# Patient Record
Sex: MALE | Race: WHITE | NOT HISPANIC OR LATINO
[De-identification: names, ages, dates, MRNs, and addresses within clinical notes are randomized per-mention and may not be internally consistent; named-entity substitution may affect disease eponyms.]

---

## 2019-06-04 ENCOUNTER — TRANSCRIPTION ENCOUNTER (OUTPATIENT)
Age: 24
End: 2019-06-04

## 2019-06-08 ENCOUNTER — TRANSCRIPTION ENCOUNTER (OUTPATIENT)
Age: 24
End: 2019-06-08

## 2019-06-10 ENCOUNTER — APPOINTMENT (OUTPATIENT)
Dept: ORTHOPEDIC SURGERY | Facility: CLINIC | Age: 24
End: 2019-06-10
Payer: COMMERCIAL

## 2019-06-10 VITALS
HEART RATE: 67 BPM | DIASTOLIC BLOOD PRESSURE: 65 MMHG | WEIGHT: 210 LBS | SYSTOLIC BLOOD PRESSURE: 130 MMHG | HEIGHT: 73 IN | BODY MASS INDEX: 27.83 KG/M2

## 2019-06-10 PROBLEM — Z00.00 ENCOUNTER FOR PREVENTIVE HEALTH EXAMINATION: Status: ACTIVE | Noted: 2019-06-10

## 2019-06-10 PROCEDURE — 73630 X-RAY EXAM OF FOOT: CPT | Mod: RT

## 2019-06-10 PROCEDURE — 99203 OFFICE O/P NEW LOW 30 MIN: CPT

## 2019-06-17 ENCOUNTER — APPOINTMENT (OUTPATIENT)
Dept: ORTHOPEDIC SURGERY | Facility: CLINIC | Age: 24
End: 2019-06-17

## 2019-06-21 ENCOUNTER — APPOINTMENT (OUTPATIENT)
Dept: ORTHOPEDIC SURGERY | Facility: CLINIC | Age: 24
End: 2019-06-21

## 2019-07-02 ENCOUNTER — APPOINTMENT (OUTPATIENT)
Dept: ORTHOPEDIC SURGERY | Facility: CLINIC | Age: 24
End: 2019-07-02
Payer: COMMERCIAL

## 2019-07-02 VITALS — WEIGHT: 210 LBS | BODY MASS INDEX: 26.95 KG/M2 | HEIGHT: 74 IN

## 2019-07-02 DIAGNOSIS — S92.414D NONDISPLACED FRACTURE OF PROXIMAL PHALANX OF RIGHT GREAT TOE, SUBSEQUENT ENCOUNTER FOR FRACTURE WITH ROUTINE HEALING: ICD-10-CM

## 2019-07-02 PROCEDURE — 73660 X-RAY EXAM OF TOE(S): CPT | Mod: T5

## 2019-07-02 PROCEDURE — 99213 OFFICE O/P EST LOW 20 MIN: CPT

## 2019-07-02 NOTE — HISTORY OF PRESENT ILLNESS
[de-identified] : 23 year old male presents for an evaluation of right foot pain that began on 6/7/2019 when he stubbed his right great toe into his staircase. On 6/8/2019 the patient was seen at [facility] and had XRays of his left foot obtained which revealed a 1st proximal phalangeal fracture, he presents with the films. On 6/10/2019 he was seen by Dr. Gonzalez who instructed him to keep his toe immobilized and follow up for repeat XRays in the next 2-3 weeks. Today he says that his pain has been improving though he still has some soreness along his right great toe that is exacerbates with walking and weight bearing. Today he presents for repeat XRays of his right great toe.

## 2019-07-02 NOTE — PHYSICAL EXAM
[Normal RLE] : Right Lower Extremity: No scars, rashes, lesions, ulcers, skin intact [Normal LLE] : Left Lower Extremity: No scars, rashes, lesions, ulcers, skin intact [Normal Touch] : sensation intact for touch [Normal] : No swelling, no edema, normal pedal pulses and normal temperature [Poor Appearance] : well-appearing [Acute Distress] : not in acute distress [Obese] : not obese [de-identified] : o XRays of the right foot were obtained at [facility] on 6/8/2019, revealed:\par First proximal phalangeal fracture.\par \par XRays taken in the office today, 7/2/2109:\par o  Left Great Toe: AP, lateral and oblique views of the foot were obtained, there are no soft tissue abnormalities, no fractures, alignment is normal, normal appearing joint spaces, normal bone density, no bony lesions.  [de-identified] : Right Lower Extremity\par o Foot:\par ¦ Inspection/Palpation : mild tenderness over the great toe, no swelling, no deformity \par ¦ Range of Motion : arc of motion full in all planes, mild discomfort on flexion of the great toe\par ¦ Stability : no joint instability on provocative testing\par ¦ Strength : all muscles 5/5\par o Skin : no erythema, no ecchymosis \par o Sensation : sensation to light touch intact\par o Vascular Exam : no edema, no cyanosis, dorsalis pedis artery pulse 2+, posterior tibial artery pulse 2+

## 2019-07-02 NOTE — ADDENDUM
[FreeTextEntry1] : I, Thi Acevedo, acted solely as a scribe for Dr. Tuan Gold on this date 07/02/2019.

## 2019-07-02 NOTE — DISCUSSION/SUMMARY
[de-identified] : The underlying pathophysiology was reviewed in great detail with the patient as well as the various treatment options, including ice, analgesics, NSAIDs, Physical therapy, steroid injections.\par \par He is to continue with bracing his right great toe. \par \par Activity modifications and restrictions were discussed. \par \par FU 2-3 for repeat XRays.

## 2019-07-02 NOTE — END OF VISIT
[FreeTextEntry3] : All medical record entries made by the Francheskaibadrienne were at my, Dr. Tuan Gold, direction and personally dictated by me on 07/02/2019. I have reviewed the chart and agree that the record accurately reflects my personal performance of the history, physical exam, assessment and plan. I have also personally directed, reviewed, and agreed with the chart.

## 2019-11-11 ENCOUNTER — TRANSCRIPTION ENCOUNTER (OUTPATIENT)
Age: 24
End: 2019-11-11

## 2019-11-13 ENCOUNTER — TRANSCRIPTION ENCOUNTER (OUTPATIENT)
Age: 24
End: 2019-11-13

## 2020-05-28 ENCOUNTER — TRANSCRIPTION ENCOUNTER (OUTPATIENT)
Age: 25
End: 2020-05-28

## 2020-07-03 ENCOUNTER — TRANSCRIPTION ENCOUNTER (OUTPATIENT)
Age: 25
End: 2020-07-03

## 2020-09-28 ENCOUNTER — TRANSCRIPTION ENCOUNTER (OUTPATIENT)
Age: 25
End: 2020-09-28

## 2020-10-07 DIAGNOSIS — Z78.9 OTHER SPECIFIED HEALTH STATUS: ICD-10-CM

## 2020-10-07 DIAGNOSIS — I48.91 UNSPECIFIED ATRIAL FIBRILLATION: ICD-10-CM

## 2020-10-08 ENCOUNTER — NON-APPOINTMENT (OUTPATIENT)
Age: 25
End: 2020-10-08

## 2020-10-08 ENCOUNTER — APPOINTMENT (OUTPATIENT)
Dept: ELECTROPHYSIOLOGY | Facility: CLINIC | Age: 25
End: 2020-10-08
Payer: COMMERCIAL

## 2020-10-08 VITALS
RESPIRATION RATE: 20 BRPM | HEIGHT: 72 IN | WEIGHT: 216 LBS | BODY MASS INDEX: 29.26 KG/M2 | HEART RATE: 87 BPM | SYSTOLIC BLOOD PRESSURE: 119 MMHG | DIASTOLIC BLOOD PRESSURE: 74 MMHG | OXYGEN SATURATION: 95 % | TEMPERATURE: 98.2 F

## 2020-10-08 PROCEDURE — 99204 OFFICE O/P NEW MOD 45 MIN: CPT

## 2020-10-08 PROCEDURE — 93000 ELECTROCARDIOGRAM COMPLETE: CPT

## 2020-10-08 NOTE — PHYSICAL EXAM
[General Appearance - Well Developed] : well developed [Well Groomed] : well groomed [General Appearance - Well Nourished] : well nourished [General Appearance - In No Acute Distress] : no acute distress [Normal Conjunctiva] : the conjunctiva exhibited no abnormalities [Normal Oral Mucosa] : normal oral mucosa [Normal Jugular Venous V Waves Present] : normal jugular venous V waves present [Heart Rate And Rhythm] : heart rate and rhythm were normal [Heart Sounds] : normal S1 and S2 [Murmurs] : no murmurs present [Edema] : no peripheral edema present [Respiration, Rhythm And Depth] : normal respiratory rhythm and effort [Auscultation Breath Sounds / Voice Sounds] : lungs were clear to auscultation bilaterally [Abdomen Soft] : soft [Abdomen Tenderness] : non-tender [Abnormal Walk] : normal gait [Nail Clubbing] : no clubbing of the fingernails [Cyanosis, Localized] : no localized cyanosis [Skin Color & Pigmentation] : normal skin color and pigmentation [] : no rash [Oriented To Time, Place, And Person] : oriented to person, place, and time [No Anxiety] : not feeling anxious [FreeTextEntry1] : guarded affect

## 2020-10-08 NOTE — HISTORY OF PRESENT ILLNESS
[FreeTextEntry1] : 24 year old gentleman presenting for evaluation of atrial fibrillation. He was found to be in atrial fibrillation during a physical exam in 9/2020. He had no prior history of cardiac disease or arrhythmias, and had an ECG in 4/2020 which did not note AF. He was started on metoprolol and ASA, and on follow-up has remained in atrial fibrillation. ECG today reveals AF with average HR 86 bpm. His Toprol dose has been recently increased to 100mg qd. \par He denies any symptoms of palpitation, dizziness, dyspnea, or syncope. He does get atypical CP on occasion. He is active and exercises without significant limitation. \par \par TTE reportedly revealed normal biventricular size and function with no valvular disease. And stress echocardiogram has been unremarkable. \par \par Of note, his father had AF in his 40s. There is no other personal or family history of cardiac disease or arrhythmias. \par He did have COVID in 4/2020, with mild symptoms. \par \par

## 2020-10-08 NOTE — DISCUSSION/SUMMARY
[FreeTextEntry1] : 24 year old gentleman presenting for evaluation of atrial fibrillation. He has been in persistent AF for the last month, despite treatment with escalating doses of beta blockade, which has been generally asymptomatic. We did discuss atrial fibrillation in detail, and the uncommon nature of this arrhythmia in young patients, and unique considerations in younger patients. It is possible that he has a genetic predisposition to AF, or that the episode was triggered by etoh excess or even COVID viral infection. Management options were also reviewed in detail. As an initial strategy I recommended DCCV to restore sinus rhythm. Further workup could then be considered including further monitoring to evaluate for recurrence, and evaluation for potential underlying etiology (eg SVT). He has a CHADSVASc=0 and can continue without anticoagulation, however, he would warrant short-term anticoagulation if DCCV is performed. He expressed skepticism about the treatment options and reports he will consider it further before proceeding. \par -if pt agrees, will plan DORCAS/DCCV, with short-term anticoagulation (eg Xarelto) instead of ASA for 4 weeks. \par -he will continue Toprol for now.\par \par

## 2020-10-08 NOTE — REVIEW OF SYSTEMS
[Shortness Of Breath] : no shortness of breath [Dyspnea on exertion] : not dyspnea during exertion [Chest  Pressure] : no chest pressure [Chest Pain] : chest pain [Lower Ext Edema] : no extremity edema [Palpitations] : no palpitations [Dizziness] : no dizziness [Convulsions] : no convulsions [Confusion] : no confusion was observed [Anxiety] : no anxiety [Easy Bleeding] : no tendency for easy bleeding [Easy Bruising] : no tendency for easy bruising [Negative] : Integumentary

## 2020-10-08 NOTE — REASON FOR VISIT
[Consultation] : a consultation regarding [Atrial Fibrillation] : atrial fibrillation [FreeTextEntry1] : Card: Dr Marko Cruz [Parent] : parent

## 2020-12-02 ENCOUNTER — APPOINTMENT (OUTPATIENT)
Dept: DISASTER EMERGENCY | Facility: CLINIC | Age: 25
End: 2020-12-02

## 2020-12-02 DIAGNOSIS — Z01.818 ENCOUNTER FOR OTHER PREPROCEDURAL EXAMINATION: ICD-10-CM

## 2020-12-03 LAB — SARS-COV-2 N GENE NPH QL NAA+PROBE: NOT DETECTED

## 2020-12-04 ENCOUNTER — OUTPATIENT (OUTPATIENT)
Dept: OUTPATIENT SERVICES | Facility: HOSPITAL | Age: 25
LOS: 1 days | End: 2020-12-04
Payer: COMMERCIAL

## 2020-12-04 ENCOUNTER — TRANSCRIPTION ENCOUNTER (OUTPATIENT)
Age: 25
End: 2020-12-04

## 2020-12-04 VITALS
DIASTOLIC BLOOD PRESSURE: 83 MMHG | HEART RATE: 98 BPM | WEIGHT: 214.95 LBS | OXYGEN SATURATION: 100 % | HEIGHT: 72 IN | RESPIRATION RATE: 16 BRPM | SYSTOLIC BLOOD PRESSURE: 136 MMHG

## 2020-12-04 DIAGNOSIS — I48.91 UNSPECIFIED ATRIAL FIBRILLATION: ICD-10-CM

## 2020-12-04 DIAGNOSIS — Z98.890 OTHER SPECIFIED POSTPROCEDURAL STATES: Chronic | ICD-10-CM

## 2020-12-04 LAB
ANION GAP SERPL CALC-SCNC: 10 MMOL/L — SIGNIFICANT CHANGE UP (ref 5–17)
APTT BLD: 41.8 SEC — HIGH (ref 27.5–35.5)
BUN SERPL-MCNC: 15 MG/DL — SIGNIFICANT CHANGE UP (ref 8–20)
CALCIUM SERPL-MCNC: 9.6 MG/DL — SIGNIFICANT CHANGE UP (ref 8.6–10.2)
CHLORIDE SERPL-SCNC: 103 MMOL/L — SIGNIFICANT CHANGE UP (ref 98–107)
CO2 SERPL-SCNC: 26 MMOL/L — SIGNIFICANT CHANGE UP (ref 22–29)
CREAT SERPL-MCNC: 0.92 MG/DL — SIGNIFICANT CHANGE UP (ref 0.5–1.3)
GLUCOSE SERPL-MCNC: 95 MG/DL — SIGNIFICANT CHANGE UP (ref 70–99)
HCT VFR BLD CALC: 52 % — HIGH (ref 39–50)
HGB BLD-MCNC: 17.6 G/DL — HIGH (ref 13–17)
INR BLD: 1.36 RATIO — HIGH (ref 0.88–1.16)
MAGNESIUM SERPL-MCNC: 1.9 MG/DL — SIGNIFICANT CHANGE UP (ref 1.8–2.6)
MCHC RBC-ENTMCNC: 29.7 PG — SIGNIFICANT CHANGE UP (ref 27–34)
MCHC RBC-ENTMCNC: 33.8 GM/DL — SIGNIFICANT CHANGE UP (ref 32–36)
MCV RBC AUTO: 87.7 FL — SIGNIFICANT CHANGE UP (ref 80–100)
PLATELET # BLD AUTO: 303 K/UL — SIGNIFICANT CHANGE UP (ref 150–400)
POTASSIUM SERPL-MCNC: 4.5 MMOL/L — SIGNIFICANT CHANGE UP (ref 3.5–5.3)
POTASSIUM SERPL-SCNC: 4.5 MMOL/L — SIGNIFICANT CHANGE UP (ref 3.5–5.3)
PROTHROM AB SERPL-ACNC: 15.6 SEC — HIGH (ref 10.6–13.6)
RBC # BLD: 5.93 M/UL — HIGH (ref 4.2–5.8)
RBC # FLD: 12.4 % — SIGNIFICANT CHANGE UP (ref 10.3–14.5)
SODIUM SERPL-SCNC: 139 MMOL/L — SIGNIFICANT CHANGE UP (ref 135–145)
WBC # BLD: 6.08 K/UL — SIGNIFICANT CHANGE UP (ref 3.8–10.5)
WBC # FLD AUTO: 6.08 K/UL — SIGNIFICANT CHANGE UP (ref 3.8–10.5)

## 2020-12-04 PROCEDURE — 92960 CARDIOVERSION ELECTRIC EXT: CPT

## 2020-12-04 PROCEDURE — 85610 PROTHROMBIN TIME: CPT

## 2020-12-04 PROCEDURE — 83735 ASSAY OF MAGNESIUM: CPT

## 2020-12-04 PROCEDURE — 93005 ELECTROCARDIOGRAM TRACING: CPT

## 2020-12-04 PROCEDURE — 36415 COLL VENOUS BLD VENIPUNCTURE: CPT

## 2020-12-04 PROCEDURE — 80048 BASIC METABOLIC PNL TOTAL CA: CPT

## 2020-12-04 PROCEDURE — 85730 THROMBOPLASTIN TIME PARTIAL: CPT

## 2020-12-04 PROCEDURE — 85027 COMPLETE CBC AUTOMATED: CPT

## 2020-12-04 PROCEDURE — 93010 ELECTROCARDIOGRAM REPORT: CPT | Mod: 76

## 2020-12-04 RX ORDER — RIVAROXABAN 15 MG-20MG
1 KIT ORAL
Qty: 0 | Refills: 0 | DISCHARGE

## 2020-12-04 RX ORDER — METOPROLOL TARTRATE 50 MG
1 TABLET ORAL
Qty: 0 | Refills: 0 | DISCHARGE
Start: 2020-12-04

## 2020-12-04 RX ORDER — METOPROLOL TARTRATE 50 MG
1 TABLET ORAL
Qty: 0 | Refills: 0 | DISCHARGE

## 2020-12-04 RX ORDER — ASPIRIN/CALCIUM CARB/MAGNESIUM 324 MG
1 TABLET ORAL
Qty: 0 | Refills: 0 | DISCHARGE

## 2020-12-04 NOTE — DISCHARGE NOTE PROVIDER - HOSPITAL COURSE
25 year old male with history of gynecomastia s/p breast reduction surgery, s/p covid-19 infection ~ 4/2020, and atrial fibrillation detected during routine physical exam in Sept. 2020.   He c/o occasional chest pain with associated dizziness but denies palpitation, shortness of breath or fatigue.  He presented electively and is now status post uncomplicated DCCV with restoration of sinus rhythm (DORCAS deferred due to strict adherence w/AC and CHADS-VASc=0).  The patient was observed per post procedure protocol, then discharged home with a plan for outpatient follow up.

## 2020-12-04 NOTE — DISCHARGE NOTE PROVIDER - NSDCCPTREATMENT_GEN_ALL_CORE_FT
PRINCIPAL PROCEDURE  Procedure: External cardioversion  Findings and Treatment: -Take each dose of your anticoagulation medication (blood thinner) exactly as prescribed.   - Do not drive, operate heavy machinery or make important decisions for 24 hours following the procedure.  - You may resume all other activities the day after the procedure.  Call your doctor if:   - your rapid heart rhythm returns.  - you have any questions or concerns regarding the procedure.  If you experience increased difficulty breathing or chest pain, or if you faint or have dizzy spells, please seek immediate medical attention.

## 2020-12-04 NOTE — H&P PST ADULT - HISTORY OF PRESENT ILLNESS
25 year old male with no significant medical history found to be in atrial fibrillation during routine physical exam in Sept. 2020.      Cardiology summary:   Stress echo: 9/18/2020 normal stress echo, no ischemia, PASP 20-25    25 year old male with history of gynecomastia s/p breast reduction surgery, s/p covid-19 infection ~ 4/2020, and atrial fibrillation during routine physical exam in Sept. 2020.   He c/o occasional chest pain with associated dizziness but denies palpitation, shortness of breath or fatigue.  He presents today for elective DORCAS/DCCV.      Cardiology summary:   Stress echo: 9/18/2020 normal stress echo, no ischemia, PASP 20-25    25 year old male with history of gynecomastia s/p breast reduction surgery, s/p covid-19 infection ~ 4/2020, and atrial fibrillation detected during routine physical exam in Sept. 2020.   He c/o occasional chest pain with associated dizziness but denies palpitation, shortness of breath or fatigue.  He presents today for elective DORCAS/DCCV.      Cardiology summary:   Stress echo: 9/18/2020 normal stress echo, no ischemia, PASP 20-25

## 2020-12-04 NOTE — PROGRESS NOTE ADULT - SUBJECTIVE AND OBJECTIVE BOX
Pt doing well s/p uncomplicated DCCV, 200J X 1 with restoration of sinus rhythm. Denies complaint post procedure.     Exam:   VSS, NAD, A&O x 3  Skin: no erythema/edema/blistering at defib pad sites.   Card: S1/S2, RRR, no m/g/r  Resp: lungs CTA b/l  Abd: S/NT/ND  Ext: no edema, distal pulses intact    EKG: sinus rhythm 62 bpm     Assessment:   25 year old male with history of gynecomastia s/p breast reduction surgery, s/p covid-19 infection ~ 4/2020, and atrial fibrillation detected during routine physical exam in Sept. 2020.   He c/o occasional chest pain with associated dizziness but denies palpitation, shortness of breath or fatigue.  He presented electively and is now status post uncomplicated DCCV with restoration of sinus rhythm (DORCAS deferred due to strict adherence w/AC and CHADS-VASc=0).       Plan:   Observation on telemetry per post op protocol.    Resume PO intake.   Ambulate w/ assist once fully awake & back to baseline mental status w/ VSS.  Continue Xarelto 20mg PO daily. Importance of strict compliance with anticoagulation regimen reinforced with pt.   Reduce Toprol to 50mg PO daily.    Anticipate d/c home once all criteria met, with outpt f/up in 1 month.

## 2020-12-04 NOTE — H&P PST ADULT - ASSESSMENT
25 year old male with history of gynecomastia s/p breast reduction surgery, s/p covid-19 infection ~ 4/2020, and atrial fibrillation during routine physical exam in Sept. 2020.   He c/o occasional chest pain with associated dizziness but denies palpitation, shortness of breath or fatigue.  He presents today for elective DORCAS/DCCV.  Pt started Xarelto 20mg PO daily w/dinner on 11/4/2020 and confirmed no missed doses since starting anticoagulation therapy.  Discussed w/Dr. Mcmanus and will forego DORCAS at this time in light of strict adherence to AC therapy.  Confirms NPO > 8 hrs.     Plan:   -iv heplock  -stat labs, EKG  -cardioversion    25 year old male with history of gynecomastia s/p breast reduction surgery, s/p covid-19 infection ~ 4/2020, and atrial fibrillation detected during routine physical exam in Sept. 2020.   He c/o occasional chest pain with associated dizziness but denies palpitation, shortness of breath or fatigue.  He presents today for elective DORCAS/DCCV.  Pt started Xarelto 20mg PO daily w/dinner on 11/4/2020 and confirmed no missed doses since starting anticoagulation therapy.  Discussed w/Dr. Mcmanus and will forego DORCAS at this time in light of strict adherence to AC therapy.  Confirms NPO > 8 hrs.     Plan:   -iv heplock  -stat labs, EKG  -cardioversion

## 2020-12-04 NOTE — DISCHARGE NOTE NURSING/CASE MANAGEMENT/SOCIAL WORK - PATIENT PORTAL LINK FT
You can access the FollowMyHealth Patient Portal offered by [facility] by registering at the following website: [email or web address]. By joining "[employer]’s FollowMyHealth portal, you will also be able to view your health information using other applications (apps) compatible with our system.

## 2020-12-04 NOTE — DISCHARGE NOTE PROVIDER - CARE PROVIDER_API CALL
Mamadou Mcmanus  CARDIOLOGY  [address], [place] [address]  Phone: [phone]  Fax: [phone]  Follow Up Time:

## 2020-12-04 NOTE — DISCHARGE NOTE PROVIDER - NSDCMRMEDTOKEN_GEN_ALL_CORE_FT
metoprolol succinate 50 mg oral tablet, extended release: 1 tab(s) orally once a day  Xarelto 20 mg oral tablet: 1 tab(s) orally once a day (in the evening)

## 2020-12-04 NOTE — H&P PST ADULT - NSICDXPASTMEDICALHX_GEN_ALL_CORE_FT
PAST MEDICAL HISTORY:  Atrial fibrillation      PAST MEDICAL HISTORY:  Atrial fibrillation     H/O gynecomastia

## 2020-12-04 NOTE — DISCHARGE NOTE PROVIDER - NSDCFUADDINST_GEN_ALL_CORE_FT
Follow up with Dr. Mcmanus in 3-4 weeks.  Our office will contact you in 3-5 days to schedule this appointment. Please call [phone] with questions or concerns.

## 2020-12-04 NOTE — H&P PST ADULT - RS GEN PE MLT RESP DETAILS PC
airway patent/no rhonchi/good air movement/clear to auscultation bilaterally/no wheezes/breath sounds equal/no rales

## 2020-12-10 PROBLEM — I48.91 UNSPECIFIED ATRIAL FIBRILLATION: Chronic | Status: ACTIVE | Noted: 2020-12-04

## 2020-12-10 PROBLEM — Z87.898 PERSONAL HISTORY OF OTHER SPECIFIED CONDITIONS: Chronic | Status: ACTIVE | Noted: 2020-12-04

## 2021-01-14 ENCOUNTER — APPOINTMENT (OUTPATIENT)
Dept: ELECTROPHYSIOLOGY | Facility: CLINIC | Age: 26
End: 2021-01-14
Payer: COMMERCIAL

## 2021-01-14 ENCOUNTER — NON-APPOINTMENT (OUTPATIENT)
Age: 26
End: 2021-01-14

## 2021-01-14 VITALS
OXYGEN SATURATION: 97 % | DIASTOLIC BLOOD PRESSURE: 58 MMHG | BODY MASS INDEX: 29.8 KG/M2 | WEIGHT: 220 LBS | HEART RATE: 79 BPM | HEIGHT: 72 IN | TEMPERATURE: 97.1 F | SYSTOLIC BLOOD PRESSURE: 90 MMHG

## 2021-01-14 DIAGNOSIS — I48.19 OTHER PERSISTENT ATRIAL FIBRILLATION: ICD-10-CM

## 2021-01-14 DIAGNOSIS — Z98.890 OTHER SPECIFIED POSTPROCEDURAL STATES: ICD-10-CM

## 2021-01-14 PROCEDURE — 99214 OFFICE O/P EST MOD 30 MIN: CPT

## 2021-01-14 PROCEDURE — 93000 ELECTROCARDIOGRAM COMPLETE: CPT

## 2021-01-14 PROCEDURE — 99072 ADDL SUPL MATRL&STAF TM PHE: CPT

## 2021-01-15 NOTE — ADDENDUM
[FreeTextEntry1] : I was present for the above evaluation and agree with the history, physical exam, assessment and plan as above. S/p DCCV and now maintaining sinus rhythm. He has noted a symptomatic improvement in this settting. He has marked sinus bradycardia (on metoprolol), and though not highly symptomatic with bradycardia at this time, he is unlikely to tolerate ongoing medical therapy. Will discontinue metoprolol. \par Also CHADSVASC=0 and has been on anticoagulaiton for >1 months since DCCV. Can stop OAC at this time as well. \par Unclear etiology of his AF (persistent) at a very young age, perhaps genetically predisposed, vs initiated in the setting of COVID infection last year. \par Would benefit from ongoing monitoring to clarify recurrence, particularly as he was not highly symptomatic initially. We discused monitoring options, and he does prefer ILR implant for long-term monitoring, to correlate with symptoms, and guide further management. The risks and benefits of ILR implant were reviewed in detail. \par Further workup to evaluate etiology of AF also dsicussed- if recurrence noted an EP study to evaluate for underlying SVT would be useful. A cardiac MRI also benefits to rule out other cardiomyopathy.

## 2021-01-15 NOTE — DISCUSSION/SUMMARY
[FreeTextEntry1] : 24 year old gentleman with COVID-19 (4/2020) and persistent atrial fibrillation initially diagnosed during routine EKG (9/2020) s/p successful cardioversion of AF to SR on 12/4/20. Unclear he has genetic predisposition to AF or even triggered by COVID viral infection. \par Now maintaining SR and notes mild symptomatic improvement and slight increase in exercise tolerance. EKG notable for marked SB @ 40bpm, which appears to be asymptomatic.  Discussed long term monitoring options to screen for recurrent arrhythmia including ILR vs periodic noninvasive.\par Will proceed with ILR implant for the most effect long term monitoring, especially given the somewhat asymptomatic nature of his prior arrhythmia.\par \par - Can d/c anticoagulation and metoprolol, given bradycardia\par - Plan for ILR implant\par - Will obtain cardiac MRI \par \par Seen and d/w Dr. Mcmanus\par Garima Tineo PAC\par

## 2021-01-15 NOTE — HISTORY OF PRESENT ILLNESS
[FreeTextEntry1] : 24 year old gentleman presenting for evaluation of atrial fibrillation. He was found to be in atrial fibrillation during a physical exam in 9/2020. He had no prior history of cardiac disease or arrhythmias, and had an ECG in 4/2020 which did not note AF. He was started on metoprolol and ASA, and on follow-up has remained in atrial fibrillation. \par \par TTE reportedly revealed normal biventricular size and function with no valvular disease. And stress echocardiogram has been unremarkable.  Of note, his father had AF in his 40s. There is no other personal or family history of cardiac disease or arrhythmias. He did have COVID in 4/2020, with mild symptoms. \par \par He underwent successful cardioversion of AF to SR on 12/4/20, Xarelto continued for one month. \par Presents for EP follow-up and remains in sinus rhythm but bradycardiac @ 40bpm. Notes mild symptomatic improvement in sinus rhythm.  Slight increase in exercise tolerance. Despite bradycardia, patient denies recent symptoms of dizziness, syncope or near syncope.  No CP, SOB, JULIAN, dizziness.

## 2021-01-15 NOTE — REVIEW OF SYSTEMS
[Negative] : Heme/Lymph [Fever] : no fever [Chills] : no chills [Feeling Fatigued] : not feeling fatigued [Shortness Of Breath] : no shortness of breath [Dyspnea on exertion] : not dyspnea during exertion [Chest Pain] : no chest pain [Lower Ext Edema] : no extremity edema [Palpitations] : no palpitations [Cough] : no cough [Abdominal Pain] : no abdominal pain [Dizziness] : no dizziness [Anxiety] : no anxiety [Confusion] : no confusion was observed [Easy Bleeding] : no tendency for easy bleeding [Easy Bruising] : no tendency for easy bruising

## 2021-06-07 NOTE — H&P PST ADULT - BSA (M2)
2.2 Staging Info: By selecting yes to the question above you will include information on AJCC 8 tumor staging in your Mohs note. Information on tumor staging will be automatically added for SCCs on the head and neck. AJCC 8 includes tumor size, tumor depth, perineural involvement and bone invasion.

## 2021-07-15 ENCOUNTER — TRANSCRIPTION ENCOUNTER (OUTPATIENT)
Age: 26
End: 2021-07-15

## 2023-01-02 ENCOUNTER — NON-APPOINTMENT (OUTPATIENT)
Age: 28
End: 2023-01-02

## 2023-01-05 ENCOUNTER — APPOINTMENT (OUTPATIENT)
Dept: GASTROENTEROLOGY | Facility: CLINIC | Age: 28
End: 2023-01-05
Payer: COMMERCIAL

## 2023-01-05 VITALS
HEART RATE: 62 BPM | RESPIRATION RATE: 14 BRPM | BODY MASS INDEX: 29.12 KG/M2 | TEMPERATURE: 97.7 F | HEIGHT: 72 IN | OXYGEN SATURATION: 98 % | SYSTOLIC BLOOD PRESSURE: 140 MMHG | WEIGHT: 215 LBS | DIASTOLIC BLOOD PRESSURE: 84 MMHG

## 2023-01-05 DIAGNOSIS — K21.9 GASTRO-ESOPHAGEAL REFLUX DISEASE W/OUT ESOPHAGITIS: ICD-10-CM

## 2023-01-05 PROCEDURE — 99204 OFFICE O/P NEW MOD 45 MIN: CPT

## 2023-01-05 RX ORDER — RIVAROXABAN 20 MG/1
20 TABLET, FILM COATED ORAL
Qty: 30 | Refills: 3 | Status: DISCONTINUED | COMMUNITY
Start: 2020-10-26 | End: 2023-01-05

## 2023-01-05 RX ORDER — OMEPRAZOLE 40 MG/1
40 CAPSULE, DELAYED RELEASE ORAL
Qty: 30 | Refills: 4 | Status: ACTIVE | COMMUNITY
Start: 2023-01-05 | End: 1900-01-01

## 2023-01-05 NOTE — HISTORY OF PRESENT ILLNESS
[FreeTextEntry1] : Patient with 2-year history of intermittent episodes of what sounds like acid reflux.  He occasionally feels some burning in the substernal sternal and subxiphoid region.  This happens no more than twice a week and sometimes will go couple weeks with no symptoms at all.  This been occurring off and on for 2 years.  He has no dysphagia odynophagia or chest pain or cough or sore throat or shortness of breath.  He has not taken medication for this.  In addition a couple days ago he noticed his throat was sore and went to a urgent center and was told he had a canker sore on his uvula.  Patient has no fever chills or any other viral symptoms.

## 2023-01-05 NOTE — PHYSICAL EXAM
[Normal] : oriented to person, place, and time [de-identified] : Uvula normal with no evidence of ulceration

## 2023-02-18 ENCOUNTER — NON-APPOINTMENT (OUTPATIENT)
Age: 28
End: 2023-02-18